# Patient Record
Sex: MALE | Race: WHITE | NOT HISPANIC OR LATINO | Employment: FULL TIME | URBAN - METROPOLITAN AREA
[De-identification: names, ages, dates, MRNs, and addresses within clinical notes are randomized per-mention and may not be internally consistent; named-entity substitution may affect disease eponyms.]

---

## 2018-01-28 ENCOUNTER — OFFICE VISIT (OUTPATIENT)
Dept: URGENT CARE | Facility: CLINIC | Age: 63
End: 2018-01-28

## 2018-01-28 VITALS
TEMPERATURE: 98.4 F | DIASTOLIC BLOOD PRESSURE: 72 MMHG | OXYGEN SATURATION: 98 % | SYSTOLIC BLOOD PRESSURE: 146 MMHG | HEIGHT: 67 IN | WEIGHT: 190 LBS | HEART RATE: 79 BPM | RESPIRATION RATE: 22 BRPM | BODY MASS INDEX: 29.82 KG/M2

## 2018-01-28 DIAGNOSIS — H00.015 HORDEOLUM EXTERNUM OF LEFT LOWER EYELID: Primary | ICD-10-CM

## 2018-01-28 DIAGNOSIS — J20.9 ACUTE BRONCHITIS, UNSPECIFIED ORGANISM: ICD-10-CM

## 2018-01-28 PROCEDURE — G0382 LEV 3 HOSP TYPE B ED VISIT: HCPCS | Performed by: FAMILY MEDICINE

## 2018-01-28 RX ORDER — METOPROLOL TARTRATE 100 MG/1
25 TABLET ORAL EVERY 12 HOURS SCHEDULED
COMMUNITY

## 2018-01-28 RX ORDER — ERYTHROMYCIN 5 MG/G
0.5 OINTMENT OPHTHALMIC EVERY 8 HOURS SCHEDULED
Qty: 21 G | Refills: 0 | Status: SHIPPED | OUTPATIENT
Start: 2018-01-28 | End: 2018-02-04

## 2018-01-28 RX ORDER — INSULIN GLARGINE 100 [IU]/ML
20 INJECTION, SOLUTION SUBCUTANEOUS
COMMUNITY

## 2018-01-28 RX ORDER — CLOPIDOGREL BISULFATE 75 MG/1
75 TABLET ORAL DAILY
COMMUNITY

## 2018-01-28 RX ORDER — DOXYCYCLINE 100 MG/1
100 TABLET ORAL 2 TIMES DAILY
Qty: 14 TABLET | Refills: 0 | Status: SHIPPED | OUTPATIENT
Start: 2018-01-28 | End: 2018-02-04

## 2018-01-28 RX ORDER — ROSUVASTATIN CALCIUM 20 MG/1
20 TABLET, COATED ORAL DAILY
COMMUNITY

## 2018-01-28 RX ORDER — LOSARTAN POTASSIUM 25 MG/1
25 TABLET ORAL DAILY
COMMUNITY

## 2018-01-28 RX ORDER — BENZONATATE 100 MG/1
100 CAPSULE ORAL 3 TIMES DAILY PRN
Qty: 20 CAPSULE | Refills: 0 | Status: SHIPPED | OUTPATIENT
Start: 2018-01-28

## 2018-01-28 NOTE — PROGRESS NOTES
[facility]     Patient Information: Rex Badillo  MRN: [id number] : 1955  Encounter: [id number]    HPI:  Patient presents with complains of left eye drainage and irritation  He denies any eye pain  He also relates he has had cough and cold symptoms for the past few weeks  He did try course even over-the-counter with slight relief of his symptoms  His cough is intermittently productive  He denies any associated fevers  He does admit to some chills  Past medical history is significant for heart disease and diabetes  He presents today with his wife  He did not wear contacts  Subjective:   Review of Systems   Constitutional: Positive for fatigue  HENT: Positive for congestion and sore throat  Negative for ear discharge, ear pain, hearing loss, rhinorrhea, sinus pain, sinus pressure, tinnitus, trouble swallowing and voice change  Eyes: Positive for discharge and redness  Respiratory: Positive for cough  Cardiovascular: Negative  Gastrointestinal: Negative  Genitourinary: Negative  Musculoskeletal: Negative  Skin: Negative  Neurological: Negative  Hematological: Negative  Objective:  /72 (BP Location: Right arm, Patient Position: Sitting, Cuff Size: Standard)   Pulse 79   Temp 98 4 °F (36 9 °C) (Tympanic)   Resp 22   Ht 5' 7" (1 702 m)   Wt 86 2 kg (190 lb)   SpO2 98%   BMI 29 76 kg/m²   Physical Exam   Constitutional: He is oriented to person, place, and time  He appears well-developed  HENT:   Head: Normocephalic  Right Ear: External ear normal    Left Ear: External ear normal    Mouth/Throat: Oropharynx is clear and moist    Eyes: EOM are normal  Pupils are equal, round, and reactive to light  Right eye exhibits discharge  Small stye noted left lower eyelid with slight eversion of the lower eyelid  Mild conjunctivitis    Funduscopic exam limits of pupillary constriction  Neck: Normal range of motion  Neck supple  No thyromegaly present  Cardiovascular: Normal rate, regular rhythm, normal heart sounds and intact distal pulses  Pulmonary/Chest: Effort normal    Decreased breath sounds with slight cough  No wheezing rales or rhonchi   Abdominal: Soft  Bowel sounds are normal  There is no tenderness  Musculoskeletal: Normal range of motion  Neurological: He is alert and oriented to person, place, and time  He has normal reflexes  Skin: Skin is warm and dry  Psychiatric: He has a normal mood and affect  Vitals reviewed  Assessment:  Diagnoses and all orders for this visit:    Hordeolum externum of left lower eyelid    Acute bronchitis, unspecified organism    Other orders  -     clopidogrel (PLAVIX) 75 mg tablet; Take 75 mg by mouth daily  -     metoprolol tartrate (LOPRESSOR) 100 mg tablet; Take 25 mg by mouth every 12 (twelve) hours  -     losartan (COZAAR) 25 mg tablet; Take 25 mg by mouth daily  -     metFORMIN (GLUCOPHAGE) 1000 MG tablet; Take 1,000 mg by mouth 2 (two) times a day with meals  -     sitaGLIPtin (JANUVIA) 100 mg tablet; Take 100 mg by mouth daily  -     rosuvastatin (CRESTOR) 20 MG tablet; Take 20 mg by mouth daily  -     insulin glargine (LANTUS) 100 units/mL subcutaneous injection; Inject 20 Units under the skin daily at bedtime        Plan:  Patient Instructions   Use erythromycin ointment in the left eye as directed  If symptoms persist over the next 48-72 hours follow up with your eye specialist   Take doxycycline and Tessalon Perles as directed  Increase  Your fluids  Follow with your family doctor in 3-4 days if symptoms persist or worsen             Huy Monday, DO 1/28/2018,2:39 PM    Portions of the record may have been created with voice recognition software   Occasional wrong word or "sound a like" substitutions may have occurred due to the inherent limitations of voice recognition software   Read the chart carefully and recognize, using context, where substitutions have occurred

## 2018-01-28 NOTE — PATIENT INSTRUCTIONS
Use erythromycin ointment in the left eye as directed  If symptoms persist over the next 48-72 hours follow up with your eye specialist   Take doxycycline and Tessalon Perles as directed  Increase  Your fluids  Follow with your family doctor in 3-4 days if symptoms persist or worsen